# Patient Record
Sex: FEMALE | Race: WHITE
[De-identification: names, ages, dates, MRNs, and addresses within clinical notes are randomized per-mention and may not be internally consistent; named-entity substitution may affect disease eponyms.]

---

## 2020-07-22 PROBLEM — Z00.00 ENCOUNTER FOR PREVENTIVE HEALTH EXAMINATION: Status: ACTIVE | Noted: 2020-07-22

## 2020-07-23 ENCOUNTER — APPOINTMENT (OUTPATIENT)
Dept: OTOLARYNGOLOGY | Facility: CLINIC | Age: 75
End: 2020-07-23
Payer: MEDICARE

## 2020-07-23 VITALS
HEIGHT: 65 IN | SYSTOLIC BLOOD PRESSURE: 133 MMHG | DIASTOLIC BLOOD PRESSURE: 81 MMHG | WEIGHT: 135 LBS | TEMPERATURE: 98.4 F | HEART RATE: 85 BPM | BODY MASS INDEX: 22.49 KG/M2

## 2020-07-23 DIAGNOSIS — H61.21 IMPACTED CERUMEN, RIGHT EAR: ICD-10-CM

## 2020-07-23 PROCEDURE — 69210 REMOVE IMPACTED EAR WAX UNI: CPT

## 2020-07-23 PROCEDURE — 99204 OFFICE O/P NEW MOD 45 MIN: CPT | Mod: 25

## 2020-07-23 RX ORDER — NAPHAZOLINE HCL/PHENIRAMINE .0268-.315
6.5 DROPS OPHTHALMIC (EYE)
Qty: 1 | Refills: 1 | Status: ACTIVE | COMMUNITY
Start: 2020-07-23 | End: 1900-01-01

## 2020-07-23 NOTE — PHYSICAL EXAM
[Midline] : trachea located in midline position [Normal] : orientation to person, place, and time: normal [de-identified] : right tm normal after removal; left ear with some residual cerumen against tm which could not be removed due to discomfort  [de-identified] : xs cerumen right ear; left impacted cerumen

## 2020-07-23 NOTE — HISTORY OF PRESENT ILLNESS
[de-identified] : Occ feels discomfort in left ear - attempted removal by primary care without help - 2 days ago.   Left ear clogged for several weeks.  Hx of hearing loss in left ear - hx of surgery for acoustic neuroma surgery 20 years ago.  No hx of tm perf.  ( Surgery with Valeriano Murillo at [facility] and Dr. Stover)

## 2020-07-23 NOTE — REVIEW OF SYSTEMS
[Ear Pain] : ear pain [Ear Itch] : ear itch [Hearing Loss] : hearing loss [Dizziness] : dizziness [Vertigo] : vertigo [Joint Pain] : joint pain [Negative] : Heme/Lymph

## 2020-07-23 NOTE — ASSESSMENT
[FreeTextEntry1] : Patient with some cerumen in right ear - removed - and left impacted cerumen - partially removed but due to discomfort could not remove hard cerumen from tm.  Recommended ear wax drops and follow up in one week with audio after.  Patient also  has no hearing by hx in left ear after acoustic neuroma surgery 20 years ago.

## 2020-07-30 ENCOUNTER — APPOINTMENT (OUTPATIENT)
Dept: OTOLARYNGOLOGY | Facility: CLINIC | Age: 75
End: 2020-07-30
Payer: MEDICARE

## 2020-07-30 VITALS — WEIGHT: 135 LBS | BODY MASS INDEX: 22.49 KG/M2 | HEIGHT: 65 IN | TEMPERATURE: 97.1 F

## 2020-07-30 DIAGNOSIS — H61.22 IMPACTED CERUMEN, LEFT EAR: ICD-10-CM

## 2020-07-30 PROCEDURE — 92567 TYMPANOMETRY: CPT

## 2020-07-30 PROCEDURE — 99213 OFFICE O/P EST LOW 20 MIN: CPT | Mod: 25

## 2020-07-30 PROCEDURE — G0268 REMOVAL OF IMPACTED WAX MD: CPT

## 2020-07-30 PROCEDURE — 92557 COMPREHENSIVE HEARING TEST: CPT

## 2020-07-30 RX ORDER — GABAPENTIN 250 MG/5ML
SOLUTION ORAL
Refills: 0 | Status: ACTIVE | COMMUNITY

## 2020-07-30 RX ORDER — CARVEDILOL 3.12 MG/1
3.12 TABLET, FILM COATED ORAL
Refills: 0 | Status: ACTIVE | COMMUNITY

## 2020-07-30 RX ORDER — TRAMADOL HYDROCHLORIDE 25 MG/1
TABLET, COATED ORAL
Refills: 0 | Status: ACTIVE | COMMUNITY

## 2020-07-30 NOTE — ASSESSMENT
[FreeTextEntry1] : Patient here for removal of  residual cerumen from left ear - removed.  Ear exam normal after.  Does have no hearing in left ear on audio and right snhl .  Patient with hx of acoustic neuroma surgery left ear many years ago.  Discussed options including possible cros hearing aide.  \par Follow up in 6 mo to one year and as necessary .

## 2020-07-30 NOTE — PHYSICAL EXAM
[Midline] : trachea located in midline position [Normal] : no rashes [de-identified] : left impacted cerumen ; right normal  [de-identified] : normal after cerumen removal

## 2020-07-30 NOTE — REASON FOR VISIT
[Subsequent Evaluation] : a subsequent evaluation for [FreeTextEntry2] : cerumen in left ear - hearing eval

## 2020-07-30 NOTE — HISTORY OF PRESENT ILLNESS
[de-identified] : Patient here for follow up of cerumen in left ear.  Hx of acoustic neuroma surgery left ear over 20 years ago with no hearing after surgery and problems localizing sounds.

## 2020-08-11 ENCOUNTER — APPOINTMENT (OUTPATIENT)
Dept: OTOLARYNGOLOGY | Facility: CLINIC | Age: 75
End: 2020-08-11

## 2020-08-13 ENCOUNTER — TRANSCRIPTION ENCOUNTER (OUTPATIENT)
Age: 75
End: 2020-08-13

## 2020-08-18 ENCOUNTER — APPOINTMENT (OUTPATIENT)
Dept: PHARMACY | Facility: CLINIC | Age: 75
End: 2020-08-18
Payer: SELF-PAY

## 2020-08-18 PROCEDURE — V5010C: CUSTOM

## 2020-09-10 ENCOUNTER — APPOINTMENT (OUTPATIENT)
Dept: PHARMACY | Facility: CLINIC | Age: 75
End: 2020-09-10
Payer: MEDICARE

## 2020-09-10 PROCEDURE — V5257F: CUSTOM | Mod: RT

## 2020-09-24 ENCOUNTER — APPOINTMENT (OUTPATIENT)
Dept: PHARMACY | Facility: CLINIC | Age: 75
End: 2020-09-24
Payer: SELF-PAY

## 2020-09-24 PROCEDURE — V5299A: CUSTOM | Mod: NC

## 2020-09-29 ENCOUNTER — APPOINTMENT (OUTPATIENT)
Dept: PHARMACY | Facility: CLINIC | Age: 75
End: 2020-09-29
Payer: SELF-PAY

## 2020-09-29 PROCEDURE — V5299A: CUSTOM | Mod: NC,RT

## 2020-10-08 ENCOUNTER — APPOINTMENT (OUTPATIENT)
Dept: PHARMACY | Facility: CLINIC | Age: 75
End: 2020-10-08
Payer: SELF-PAY

## 2020-10-08 PROCEDURE — V5299A: CUSTOM | Mod: NC

## 2020-10-19 ENCOUNTER — NON-APPOINTMENT (OUTPATIENT)
Age: 75
End: 2020-10-19

## 2020-10-22 ENCOUNTER — APPOINTMENT (OUTPATIENT)
Dept: PHARMACY | Facility: CLINIC | Age: 75
End: 2020-10-22

## 2021-02-02 ENCOUNTER — APPOINTMENT (OUTPATIENT)
Dept: PHARMACY | Facility: CLINIC | Age: 76
End: 2021-02-02

## 2021-02-16 ENCOUNTER — APPOINTMENT (OUTPATIENT)
Dept: PHARMACY | Facility: CLINIC | Age: 76
End: 2021-02-16
Payer: SELF-PAY

## 2021-02-16 PROCEDURE — V5299A: CUSTOM | Mod: NC,RT

## 2021-02-16 PROCEDURE — V5267D: CUSTOM

## 2021-02-16 PROCEDURE — V5265A: CUSTOM

## 2021-10-14 ENCOUNTER — APPOINTMENT (OUTPATIENT)
Dept: PHARMACY | Facility: CLINIC | Age: 76
End: 2021-10-14
Payer: SELF-PAY

## 2021-10-14 PROCEDURE — V5299A: CUSTOM | Mod: NC

## 2021-10-18 ENCOUNTER — APPOINTMENT (OUTPATIENT)
Dept: OTOLARYNGOLOGY | Facility: CLINIC | Age: 76
End: 2021-10-18
Payer: MEDICARE

## 2021-10-18 ENCOUNTER — APPOINTMENT (OUTPATIENT)
Dept: PHARMACY | Facility: CLINIC | Age: 76
End: 2021-10-18
Payer: SELF-PAY

## 2021-10-18 VITALS — HEIGHT: 65 IN | TEMPERATURE: 96.8 F | WEIGHT: 138 LBS | BODY MASS INDEX: 22.99 KG/M2

## 2021-10-18 DIAGNOSIS — J32.0 CHRONIC MAXILLARY SINUSITIS: ICD-10-CM

## 2021-10-18 PROCEDURE — V5299A: CUSTOM | Mod: NC

## 2021-10-18 PROCEDURE — 99214 OFFICE O/P EST MOD 30 MIN: CPT | Mod: 25

## 2021-10-18 PROCEDURE — 69210 REMOVE IMPACTED EAR WAX UNI: CPT

## 2021-10-18 PROCEDURE — 31231 NASAL ENDOSCOPY DX: CPT

## 2021-10-18 RX ORDER — OXYCODONE AND ACETAMINOPHEN 5; 325 MG/1; MG/1
5-325 TABLET ORAL
Qty: 12 | Refills: 0 | Status: ACTIVE | COMMUNITY
Start: 2021-06-08

## 2021-10-18 RX ORDER — DICLOFENAC SODIUM 1% 10 MG/G
1 GEL TOPICAL
Qty: 100 | Refills: 0 | Status: ACTIVE | COMMUNITY
Start: 2021-08-30

## 2021-10-18 RX ORDER — GABAPENTIN 300 MG/1
300 CAPSULE ORAL
Qty: 60 | Refills: 0 | Status: ACTIVE | COMMUNITY
Start: 2021-10-08

## 2021-10-18 RX ORDER — AMOXICILLIN 500 MG/1
500 CAPSULE ORAL
Qty: 21 | Refills: 0 | Status: ACTIVE | COMMUNITY
Start: 2021-06-08

## 2021-10-18 RX ORDER — CHLORHEXIDINE GLUCONATE, 0.12% ORAL RINSE 1.2 MG/ML
0.12 SOLUTION DENTAL
Qty: 473 | Refills: 0 | Status: ACTIVE | COMMUNITY
Start: 2021-06-08

## 2021-10-18 RX ORDER — CARVEDILOL 6.25 MG/1
6.25 TABLET, FILM COATED ORAL
Qty: 180 | Refills: 0 | Status: ACTIVE | COMMUNITY
Start: 2021-09-08

## 2021-10-18 NOTE — ASSESSMENT
[FreeTextEntry1] : Patient with clogged right ear.  Wears right hearing aide - no left - hx of left acoustic and no hearing in left ear.  Also concerns about possible sinusitis wiith facial pressure for past 2 days. No evidence of sinusitis at this time and recommended conservative care with sinus rinses.  \par Cerumen in ears removed and can continue with hearing aides and follow up in 6 mos and as necessary

## 2021-10-18 NOTE — HISTORY OF PRESENT ILLNESS
[de-identified] : Here for re-eval of cerumen.  Occ notes some presuure around face.  Discomfort around face.  concern about sinusitis.

## 2022-02-07 ENCOUNTER — APPOINTMENT (OUTPATIENT)
Dept: OPHTHALMOLOGY | Facility: CLINIC | Age: 77
End: 2022-02-07
Payer: MEDICARE

## 2022-02-07 ENCOUNTER — NON-APPOINTMENT (OUTPATIENT)
Age: 77
End: 2022-02-07

## 2022-02-07 PROCEDURE — 92020 GONIOSCOPY: CPT

## 2022-02-07 PROCEDURE — 92012 INTRM OPH EXAM EST PATIENT: CPT

## 2022-04-04 ENCOUNTER — APPOINTMENT (OUTPATIENT)
Dept: OTOLARYNGOLOGY | Facility: CLINIC | Age: 77
End: 2022-04-04

## 2022-04-12 ENCOUNTER — APPOINTMENT (OUTPATIENT)
Dept: PHARMACY | Facility: CLINIC | Age: 77
End: 2022-04-12
Payer: SELF-PAY

## 2022-04-12 ENCOUNTER — APPOINTMENT (OUTPATIENT)
Dept: OTOLARYNGOLOGY | Facility: CLINIC | Age: 77
End: 2022-04-12
Payer: MEDICARE

## 2022-04-12 VITALS — TEMPERATURE: 96.8 F | WEIGHT: 138 LBS | HEIGHT: 65 IN | BODY MASS INDEX: 22.99 KG/M2

## 2022-04-12 DIAGNOSIS — H90.5 UNSPECIFIED SENSORINEURAL HEARING LOSS: ICD-10-CM

## 2022-04-12 PROCEDURE — V5299A: CUSTOM | Mod: NC

## 2022-04-12 PROCEDURE — G0268 REMOVAL OF IMPACTED WAX MD: CPT

## 2022-04-12 PROCEDURE — 99213 OFFICE O/P EST LOW 20 MIN: CPT | Mod: 25

## 2022-04-12 PROCEDURE — 92557 COMPREHENSIVE HEARING TEST: CPT

## 2022-04-12 NOTE — ASSESSMENT
[FreeTextEntry1] : Patient with hx of left acoustic - here for followup of cermen and hearing.   Cerumen removed bilat and audio essentially unchanged.  Continue wht hearing aides and follow up in 6 mos and as necessary

## 2022-04-12 NOTE — PHYSICAL EXAM
[Nasal Endoscopy Performed] : nasal endoscopy was performed, see procedure section for findings [] : septum deviated to the left [Midline] : trachea located in midline position [Normal] : no rashes [de-identified] : bilateral impacted cerumen  [de-identified] : normal after cerumen removal

## 2022-04-12 NOTE — HISTORY OF PRESENT ILLNESS
[de-identified] : Here for followup of hearing and cerumen . No other complaints. Last audio was 2 years ago.

## 2022-06-06 ENCOUNTER — APPOINTMENT (OUTPATIENT)
Dept: OPHTHALMOLOGY | Facility: CLINIC | Age: 77
End: 2022-06-06
Payer: MEDICARE

## 2022-06-06 ENCOUNTER — NON-APPOINTMENT (OUTPATIENT)
Age: 77
End: 2022-06-06

## 2022-06-06 PROCEDURE — 92014 COMPRE OPH EXAM EST PT 1/>: CPT

## 2022-09-30 NOTE — PHYSICAL EXAM
[de-identified] : bilateral impacted cerumen  [de-identified] : normal after cerumen removal  [Nasal Endoscopy Performed] : nasal endoscopy was performed, see procedure section for findings [] : septum deviated to the left [Midline] : trachea located in midline position [Normal] : no rashes declines

## 2022-12-07 ENCOUNTER — NON-APPOINTMENT (OUTPATIENT)
Age: 77
End: 2022-12-07

## 2022-12-07 ENCOUNTER — APPOINTMENT (OUTPATIENT)
Dept: OPHTHALMOLOGY | Facility: CLINIC | Age: 77
End: 2022-12-07

## 2022-12-07 PROCEDURE — 92020 GONIOSCOPY: CPT

## 2022-12-07 PROCEDURE — 92012 INTRM OPH EXAM EST PATIENT: CPT

## 2023-03-30 ENCOUNTER — APPOINTMENT (OUTPATIENT)
Dept: PHARMACY | Facility: CLINIC | Age: 78
End: 2023-03-30
Payer: SELF-PAY

## 2023-03-30 ENCOUNTER — APPOINTMENT (OUTPATIENT)
Dept: OTOLARYNGOLOGY | Facility: CLINIC | Age: 78
End: 2023-03-30
Payer: MEDICARE

## 2023-03-30 VITALS
TEMPERATURE: 95.3 F | SYSTOLIC BLOOD PRESSURE: 133 MMHG | DIASTOLIC BLOOD PRESSURE: 81 MMHG | WEIGHT: 137 LBS | HEIGHT: 65 IN | BODY MASS INDEX: 22.82 KG/M2

## 2023-03-30 PROCEDURE — G0268 REMOVAL OF IMPACTED WAX MD: CPT

## 2023-03-30 PROCEDURE — 92567 TYMPANOMETRY: CPT

## 2023-03-30 PROCEDURE — 99213 OFFICE O/P EST LOW 20 MIN: CPT | Mod: 25

## 2023-03-30 PROCEDURE — V5267D: CUSTOM

## 2023-03-30 PROCEDURE — V5267C: CUSTOM

## 2023-03-30 PROCEDURE — 92557 COMPREHENSIVE HEARING TEST: CPT

## 2023-03-30 PROCEDURE — V5299A: CUSTOM | Mod: NC

## 2023-03-30 RX ORDER — ROSUVASTATIN CALCIUM 5 MG/1
TABLET, FILM COATED ORAL
Refills: 0 | Status: ACTIVE | COMMUNITY

## 2023-03-30 NOTE — ASSESSMENT
[FreeTextEntry1] : Patient here for eval of ears - here for cerumen check and followup audio - - bilat cerumen removed and exam normal after - audio essentially unchanged.  Would continue with hearing aides and followup in one year.

## 2023-03-30 NOTE — HISTORY OF PRESENT ILLNESS
[de-identified] : Here for followup - does wear hearing aides and has cerumen. Last audio one year ago

## 2023-03-30 NOTE — PHYSICAL EXAM
[Nasal Endoscopy Performed] : nasal endoscopy was performed, see procedure section for findings [] : septum deviated to the left [Midline] : trachea located in midline position [Normal] : no rashes [de-identified] : bilateral impacted cerumen  [de-identified] : normal after cerumen removal

## 2023-06-07 ENCOUNTER — NON-APPOINTMENT (OUTPATIENT)
Age: 78
End: 2023-06-07

## 2023-06-07 ENCOUNTER — APPOINTMENT (OUTPATIENT)
Dept: OPHTHALMOLOGY | Facility: CLINIC | Age: 78
End: 2023-06-07
Payer: MEDICARE

## 2023-06-07 PROCEDURE — 92020 GONIOSCOPY: CPT

## 2023-06-07 PROCEDURE — 92014 COMPRE OPH EXAM EST PT 1/>: CPT

## 2023-08-03 ENCOUNTER — APPOINTMENT (OUTPATIENT)
Dept: PHARMACY | Facility: CLINIC | Age: 78
End: 2023-08-03
Payer: SELF-PAY

## 2023-08-03 PROCEDURE — V5299D: CUSTOM | Mod: RT

## 2023-08-24 ENCOUNTER — APPOINTMENT (OUTPATIENT)
Dept: PHARMACY | Facility: CLINIC | Age: 78
End: 2023-08-24

## 2024-03-11 ENCOUNTER — APPOINTMENT (OUTPATIENT)
Dept: PHARMACY | Facility: CLINIC | Age: 79
End: 2024-03-11

## 2024-04-22 ENCOUNTER — APPOINTMENT (OUTPATIENT)
Dept: OTOLARYNGOLOGY | Facility: CLINIC | Age: 79
End: 2024-04-22
Payer: MEDICARE

## 2024-04-22 ENCOUNTER — APPOINTMENT (OUTPATIENT)
Dept: PHARMACY | Facility: CLINIC | Age: 79
End: 2024-04-22
Payer: SELF-PAY

## 2024-04-22 VITALS — BODY MASS INDEX: 23.32 KG/M2 | WEIGHT: 140 LBS | HEIGHT: 65 IN

## 2024-04-22 DIAGNOSIS — H61.23 IMPACTED CERUMEN, BILATERAL: ICD-10-CM

## 2024-04-22 DIAGNOSIS — H90.3 SENSORINEURAL HEARING LOSS, BILATERAL: ICD-10-CM

## 2024-04-22 DIAGNOSIS — D33.3 BENIGN NEOPLASM OF CRANIAL NERVES: ICD-10-CM

## 2024-04-22 PROCEDURE — 92567 TYMPANOMETRY: CPT

## 2024-04-22 PROCEDURE — G0268 REMOVAL OF IMPACTED WAX MD: CPT

## 2024-04-22 PROCEDURE — V5299J: CUSTOM

## 2024-04-22 PROCEDURE — 99214 OFFICE O/P EST MOD 30 MIN: CPT | Mod: 25

## 2024-04-22 PROCEDURE — 92557 COMPREHENSIVE HEARING TEST: CPT

## 2024-04-22 RX ORDER — EZETIMIBE 10 MG/1
10 TABLET ORAL
Refills: 0 | Status: ACTIVE | COMMUNITY

## 2024-04-22 NOTE — PHYSICAL EXAM
[] : septum deviated to the left [Midline] : trachea located in midline position [Normal] : no rashes [de-identified] : bilateral impacted cerumen  [de-identified] : normal after cerumen removal

## 2024-04-22 NOTE — HISTORY OF PRESENT ILLNESS
[de-identified] : Here for followup of cerumen -  Hx of left acoustic - Does wear hearing aide right ear.

## 2024-04-22 NOTE — ASSESSMENT
[FreeTextEntry1] : Patient wiht left acoustic - wears heairng aide right ear.  Occ clogged ear.  Bilat cerumen removed.  Audio essentially unchanged.  Recommended continue with hearing aides - discussed cros aide - patient not interested at this time.  Followup one year

## 2024-04-22 NOTE — DATA REVIEWED
[de-identified] : Type A tymps, AU. AD- Essentially mild sloping to moderately-severe SNHL. AS- Profound SNHL.

## 2024-06-10 ENCOUNTER — APPOINTMENT (OUTPATIENT)
Dept: OPHTHALMOLOGY | Facility: CLINIC | Age: 79
End: 2024-06-10
Payer: MEDICARE

## 2024-06-10 ENCOUNTER — NON-APPOINTMENT (OUTPATIENT)
Age: 79
End: 2024-06-10

## 2024-06-10 PROCEDURE — 92014 COMPRE OPH EXAM EST PT 1/>: CPT

## 2024-06-10 PROCEDURE — 92020 GONIOSCOPY: CPT

## 2025-04-09 ENCOUNTER — APPOINTMENT (OUTPATIENT)
Dept: PHARMACY | Facility: CLINIC | Age: 80
End: 2025-04-09
Payer: SELF-PAY

## 2025-04-09 ENCOUNTER — NON-APPOINTMENT (OUTPATIENT)
Age: 80
End: 2025-04-09

## 2025-04-09 ENCOUNTER — APPOINTMENT (OUTPATIENT)
Dept: OTOLARYNGOLOGY | Facility: CLINIC | Age: 80
End: 2025-04-09
Payer: MEDICARE

## 2025-04-09 VITALS — WEIGHT: 140 LBS | HEIGHT: 65 IN | BODY MASS INDEX: 23.32 KG/M2

## 2025-04-09 DIAGNOSIS — D33.3 BENIGN NEOPLASM OF CRANIAL NERVES: ICD-10-CM

## 2025-04-09 DIAGNOSIS — H61.23 IMPACTED CERUMEN, BILATERAL: ICD-10-CM

## 2025-04-09 DIAGNOSIS — H90.5 UNSPECIFIED SENSORINEURAL HEARING LOSS: ICD-10-CM

## 2025-04-09 PROCEDURE — 92557 COMPREHENSIVE HEARING TEST: CPT

## 2025-04-09 PROCEDURE — 99214 OFFICE O/P EST MOD 30 MIN: CPT | Mod: 25

## 2025-04-09 PROCEDURE — V5267C: CUSTOM

## 2025-04-09 PROCEDURE — V5299J: CUSTOM

## 2025-04-09 PROCEDURE — 92567 TYMPANOMETRY: CPT

## 2025-04-09 RX ORDER — ATORVASTATIN CALCIUM 10 MG/1
10 TABLET, FILM COATED ORAL
Refills: 0 | Status: ACTIVE | COMMUNITY

## 2025-04-09 RX ORDER — CLOPIDOGREL 75 MG/1
TABLET, FILM COATED ORAL
Refills: 0 | Status: ACTIVE | COMMUNITY

## 2025-04-14 ENCOUNTER — APPOINTMENT (OUTPATIENT)
Dept: PHARMACY | Facility: CLINIC | Age: 80
End: 2025-04-14

## 2025-06-09 ENCOUNTER — APPOINTMENT (OUTPATIENT)
Dept: OPHTHALMOLOGY | Facility: CLINIC | Age: 80
End: 2025-06-09
Payer: MEDICARE

## 2025-06-09 ENCOUNTER — NON-APPOINTMENT (OUTPATIENT)
Age: 80
End: 2025-06-09

## 2025-06-09 PROCEDURE — 92020 GONIOSCOPY: CPT

## 2025-06-09 PROCEDURE — 92014 COMPRE OPH EXAM EST PT 1/>: CPT
